# Patient Record
Sex: MALE | URBAN - METROPOLITAN AREA
[De-identification: names, ages, dates, MRNs, and addresses within clinical notes are randomized per-mention and may not be internally consistent; named-entity substitution may affect disease eponyms.]

---

## 2019-05-08 ENCOUNTER — OFFICE VISIT (OUTPATIENT)
Dept: URBAN - METROPOLITAN AREA CLINIC 76 | Facility: CLINIC | Age: 74
End: 2019-05-08
Payer: COMMERCIAL

## 2019-05-08 DIAGNOSIS — H52.4 PRESBYOPIA: Primary | ICD-10-CM

## 2019-05-08 DIAGNOSIS — Z96.1 PRESENCE OF INTRAOCULAR LENS: ICD-10-CM

## 2019-05-08 DIAGNOSIS — H02.052 TRICHIASIS WITHOUT ENTROPION RIGHT LOWER EYELID: ICD-10-CM

## 2019-05-08 DIAGNOSIS — H10.45 OTHER CHRONIC ALLERGIC CONJUNCTIVITIS: ICD-10-CM

## 2019-05-08 PROCEDURE — 92015 DETERMINE REFRACTIVE STATE: CPT | Performed by: OPTOMETRIST

## 2019-05-08 PROCEDURE — 92004 COMPRE OPH EXAM NEW PT 1/>: CPT | Performed by: OPTOMETRIST

## 2019-05-08 RX ORDER — KETOTIFEN FUMARATE 0.35 MG/ML
SOLUTION/ DROPS OPHTHALMIC
Qty: 15 | Refills: 3 | Status: ACTIVE
Start: 2019-05-08

## 2019-05-08 ASSESSMENT — VISUAL ACUITY
OD: 20/30
OS: 20/25

## 2019-05-08 ASSESSMENT — INTRAOCULAR PRESSURE
OD: 17
OS: 17

## 2019-05-08 ASSESSMENT — KERATOMETRY
OD: 42.38
OS: 43.75

## 2019-05-08 NOTE — IMPRESSION/PLAN
Impression: Trichiasis without entropion right lower eyelid: H02.052. Plan: Removed at slit lamp without complication.

## 2019-08-12 ENCOUNTER — OFFICE VISIT (OUTPATIENT)
Dept: URBAN - METROPOLITAN AREA CLINIC 76 | Facility: CLINIC | Age: 74
End: 2019-08-12
Payer: COMMERCIAL

## 2019-08-12 DIAGNOSIS — H00.025 HORDEOLUM INTERNUM LEFT LOWER EYELID: Primary | ICD-10-CM

## 2019-08-12 PROCEDURE — 99213 OFFICE O/P EST LOW 20 MIN: CPT | Performed by: OPTOMETRIST

## 2019-08-12 RX ORDER — AMOXICILLIN AND CLAVULANATE POTASSIUM 500; 125 MG/1; 1/1
TABLET, FILM COATED ORAL
Qty: 30 | Refills: 0 | Status: INACTIVE
Start: 2019-08-12 | End: 2019-08-12

## 2019-08-12 RX ORDER — NEOMYCIN SULFATE, POLYMYXIN B SULFATE AND DEXAMETHASONE 3.5; 10000; 1 MG/ML; [USP'U]/ML; MG/ML
SUSPENSION OPHTHALMIC
Qty: 10 | Refills: 0 | Status: INACTIVE
Start: 2019-08-12 | End: 2019-09-03

## 2019-08-12 RX ORDER — AMOXICILLIN AND CLAVULANATE POTASSIUM 500; 125 MG/1; 1/1
TABLET, FILM COATED ORAL
Qty: 30 | Refills: 0 | Status: INACTIVE
Start: 2019-08-12 | End: 2019-09-03

## 2019-08-12 ASSESSMENT — INTRAOCULAR PRESSURE
OD: 18
OS: 17

## 2019-08-12 NOTE — IMPRESSION/PLAN
Impression: Hordeolum internum left lower eyelid: H00.025. Plan: Discussed diagnosis in detail with patient. Recommend focal heat in 20 minute increments and massage 3 times per day. Then instill Maxitrol TID OS and take Augmentin 500mg 3 times per day. Pt to call if symptoms worsen.

## 2019-08-19 ENCOUNTER — OFFICE VISIT (OUTPATIENT)
Dept: URBAN - METROPOLITAN AREA CLINIC 76 | Facility: CLINIC | Age: 74
End: 2019-08-19
Payer: COMMERCIAL

## 2019-08-19 PROCEDURE — 99213 OFFICE O/P EST LOW 20 MIN: CPT | Performed by: OPTOMETRIST

## 2019-08-19 ASSESSMENT — INTRAOCULAR PRESSURE
OS: 16
OD: 15

## 2019-08-19 NOTE — IMPRESSION/PLAN
Impression: Hordeolum internum left lower eyelid: H00.025. 80% healed. Plan: Discussed diagnosis in detail with patient. Continue moist heat with massage 2-3 times per day. Continue Augmentin 500mg 3 times per day. Decrease Maxitrol QHS OU. Recommend use lubricant (Soothe XP- sample given) 5 mins after Maxitrol. Pt to call if symptoms worsen.

## 2019-09-03 ENCOUNTER — OFFICE VISIT (OUTPATIENT)
Dept: URBAN - METROPOLITAN AREA CLINIC 76 | Facility: CLINIC | Age: 74
End: 2019-09-03
Payer: COMMERCIAL

## 2019-09-03 DIAGNOSIS — H02.889 MEIBOMIAN GLAND DYSFUNCTION OF UNSPECIFIED EYE, UNSPECIFIED EYELID: ICD-10-CM

## 2019-09-03 PROCEDURE — 99213 OFFICE O/P EST LOW 20 MIN: CPT | Performed by: OPTOMETRIST

## 2019-09-03 PROCEDURE — 67820 REVISE EYELASHES: CPT | Performed by: OPTOMETRIST

## 2019-09-03 ASSESSMENT — INTRAOCULAR PRESSURE
OS: 16
OD: 18

## 2019-09-03 NOTE — IMPRESSION/PLAN
Impression: Hordeolum internum left lower eyelid: H00.025. residual hordeolum LLL. Plan: Discussed diagnosis in detail with patient. Continue moist heat with massage 2-3 times per day for maintenance.

## 2019-09-03 NOTE — IMPRESSION/PLAN
Impression: Meibomian gland dysfunction of unspecified eye, unspecified eyelid: H02.889. OU. Plan: Discussed diagnosis in detail with patient. Moist heat with lid massage from top / down, bottom / up, and sweep from inside / out x 2. Patient instructed to use emulsive base lubricant 3-4 x a day. Increase omega foods/nuts and/or Borage oil supplement 1500-2500mg capsule orally per day.

## 2019-09-03 NOTE — IMPRESSION/PLAN
Impression: Trichiasis without entropion right lower eyelid: H02.052. OD. one lash poking. Plan: Discussed condition. Recommend epilation. Pt agrees. Removed one lash with forceps at slit lamp with no complications.

## 2024-04-18 ENCOUNTER — OFFICE VISIT (OUTPATIENT)
Dept: URBAN - METROPOLITAN AREA CLINIC 58 | Facility: CLINIC | Age: 79
End: 2024-04-18
Payer: MEDICARE

## 2024-04-18 DIAGNOSIS — H17.11 CENTRAL CORNEAL OPACITY, RIGHT EYE: Primary | ICD-10-CM

## 2024-04-18 DIAGNOSIS — H52.4 PRESBYOPIA: ICD-10-CM

## 2024-04-18 PROCEDURE — 92004 COMPRE OPH EXAM NEW PT 1/>: CPT | Performed by: OPTOMETRIST

## 2024-04-18 ASSESSMENT — KERATOMETRY
OS: 44.13
OD: 42.63

## 2024-04-18 ASSESSMENT — VISUAL ACUITY
OD: 20/40
OS: 20/30

## 2024-04-18 ASSESSMENT — INTRAOCULAR PRESSURE
OD: 18
OS: 17